# Patient Record
Sex: MALE | Race: BLACK OR AFRICAN AMERICAN | URBAN - METROPOLITAN AREA
[De-identification: names, ages, dates, MRNs, and addresses within clinical notes are randomized per-mention and may not be internally consistent; named-entity substitution may affect disease eponyms.]

---

## 2019-02-05 ENCOUNTER — OFFICE VISIT (OUTPATIENT)
Dept: URGENT CARE | Facility: URGENT CARE | Age: 24
End: 2019-02-05
Payer: COMMERCIAL

## 2019-02-05 VITALS
RESPIRATION RATE: 16 BRPM | DIASTOLIC BLOOD PRESSURE: 84 MMHG | HEART RATE: 74 BPM | TEMPERATURE: 97.5 F | OXYGEN SATURATION: 100 % | SYSTOLIC BLOOD PRESSURE: 120 MMHG

## 2019-02-05 DIAGNOSIS — L08.9 LOCAL INFECTION OF SKIN AND SUBCUTANEOUS TISSUE: Primary | ICD-10-CM

## 2019-02-05 PROCEDURE — 99203 OFFICE O/P NEW LOW 30 MIN: CPT | Performed by: PHYSICIAN ASSISTANT

## 2019-02-05 RX ORDER — CLINDAMYCIN AND BENZOYL PEROXIDE 10; 50 MG/G; MG/G
GEL TOPICAL
COMMUNITY
Start: 2017-06-12

## 2019-02-05 RX ORDER — CLINDAMYCIN HCL 300 MG
300 CAPSULE ORAL 3 TIMES DAILY
Qty: 30 CAPSULE | Refills: 0 | Status: SHIPPED | OUTPATIENT
Start: 2019-02-05

## 2019-02-06 NOTE — PROGRESS NOTES
Patient presents with:  Urgent Care: bump on side of forehead    SUBJECTIVE:  Jeb Hess is a 23 year old male who presents to the clinic today for a lump on the right side of his forehead, onset today.  Denies any trauma.    Denies manipulating any lesions, but states that there may have been a pimple or cyst in the area.    He has had a cyst drain in the past, similar to this.      He does use a topical medication for acne.      No past medical history on file.     ACNE     No Known Allergies  Social History     Tobacco Use     Smoking status: Never Smoker     Smokeless tobacco: Never Used   Substance Use Topics     Alcohol use: Not on file       ROS:  CONSTITUTIONAL:NEGATIVE for fever, chills, change in weight  INTEGUMENTARY/SKIN: as per HPI  ENT/MOUTH: NEGATIVE for ear, mouth and throat problems  RESP:NEGATIVE for significant cough or SOB  MUSCULOSKELETAL: NEGATIVE for significant arthralgias or myalgia  NEURO: NEGATIVE for weakness, dizziness or paresthesias  Review of systems negative except as stated above.    EXAM:   /84   Pulse 74   Temp 97.5  F (36.4  C) (Oral)   Resp 16   SpO2 100%   GENERAL: alert, no acute distress.  SKIN: erythematous raised cystic lesion 1.5cm in diameter.  No drainage.  Mildly tender to touch.    (L08.9) Local infection of skin and subcutaneous tissue  (primary encounter diagnosis)  Comment: with possible underlying lipoma versus other cyst.    Plan: clindamycin 300mg 1 po BID for 10 days #30.    Warm compress to area.  Follow up should symptoms persist or worsen.     Advise Dermatology evaluation.      DERMATOLOGY REFERRAL        Patient expresses understanding and agreement with the assessment and plan as above.

## 2019-02-06 NOTE — PATIENT INSTRUCTIONS
(L08.9) Local infection of skin and subcutaneous tissue  (primary encounter diagnosis)  Comment: with possible underlying lipoma versus other cyst.    Plan: clindamycin 300mg 1 po BID for 10 days #30.    Warm compress to area.  Follow up should symptoms persist or worsen.     Advise Dermatology evaluation.            DERMATOLOGY REFERRAL